# Patient Record
Sex: MALE | Race: BLACK OR AFRICAN AMERICAN | Employment: FULL TIME | URBAN - METROPOLITAN AREA
[De-identification: names, ages, dates, MRNs, and addresses within clinical notes are randomized per-mention and may not be internally consistent; named-entity substitution may affect disease eponyms.]

---

## 2019-08-21 NOTE — PERIOP NOTES
[facility]  Ambulatory Surgery Unit  Pre-operative Instructions    Surgery/Procedure Date  8/28            Tentative Arrival Time 10:30am      1. On the day of your surgery/procedure, please report to the Ambulatory Surgery Unit Registration Desk and sign in at your designated time. The Ambulatory Surgery Unit is located in [facility] on the [facility] side of the \[facility] [place] [facility]\"" across from the [address] [place] [address]. Please have all of your health insurance cards and a photo ID. 2. You must have someone with you to drive you home, as you should not drive a car for 24 hours following anesthesia. Please make arrangements for a responsible adult friend or family member to stay with you for at least the first 24 hours after your surgery. 3. Do not have anything to eat or drink (including water, gum, mints, coffee, juice) after 11:59 PM  8/27. This may not apply to medications prescribed by your physician. (Please note below the special instructions with medications to take the morning of surgery, if applicable.)    4. We recommend you do not drink any alcoholic beverages for 24 hours before and after your surgery. 5. Contact your surgeons office for instructions on the following medications: non-steroidal anti-inflammatory drugs (i.e. Advil, Aleve), vitamins, and supplements. (Some surgeons will want you to stop these medications prior to surgery and others may allow you to take them)   **If you are currently taking Plavix, Coumadin, Aspirin and/or other blood-thinning agents, contact your surgeon for instructions. ** Your surgeon will partner with the physician prescribing these medications to determine if it is safe to stop or if you need to continue taking. Please do not stop taking these medications without instructions from your surgeon.     6. In an effort to help prevent surgical site infection, we ask that you shower with an anti-bacterial soap (i.e. Dial/Safeguard, or the soap provided to you at your preadmission testing appointment) for 3 days prior to and on the morning of surgery, using a fresh towel after each shower. (Please begin this process with fresh bed linens.) Do not apply any lotions, powders, or deodorants after the shower on the day of your procedure. If applicable, please do not shave the operative site for 48 hours prior to surgery. 7. Wear comfortable clothes. Wear glasses instead of contacts. Do not bring any jewelry or money (other than copays or fees as instructed). Do not wear make-up, particularly mascara, the morning of your surgery. Do not wear nail polish, particularly if you are having foot /hand surgery. Wear your hair loose or down, no ponytails, buns, benjamin pins or clips. All body piercings must be removed. 8. You should understand that if you do not follow these instructions your surgery may be cancelled. If your physical condition changes (i.e. fever, cold or flu) please contact your surgeon as soon as possible. 9. It is important that you be on time. If a situation occurs where you may be late, or if you have any questions or problems, please call [phone].    10. Your surgery time may be subject to change. You will receive a phone call the day prior to surgery to confirm your arrival time. Special Instructions: Take all medications and inhalers, as prescribed, on the morning of surgery with a sip of water EXCEPT: none      I understand a pre-operative phone call will be made to verify my surgery time. In the event that I am not available, I give permission for a message to be left on my answering service and/or with another person?       yes    Reviewed by phone with pt, verbalized understanding.     ___________________      ___________________      ________________  (Signature of Patient)          (Witness)                   (Date and Time)

## 2019-08-27 ENCOUNTER — ANESTHESIA EVENT (OUTPATIENT)
Dept: SURGERY | Age: 24
End: 2019-08-27
Payer: MEDICAID

## 2019-08-28 ENCOUNTER — ANESTHESIA (OUTPATIENT)
Dept: SURGERY | Age: 24
End: 2019-08-28
Payer: MEDICAID

## 2019-08-28 ENCOUNTER — HOSPITAL ENCOUNTER (OUTPATIENT)
Age: 24
Setting detail: OUTPATIENT SURGERY
Discharge: HOME | End: 2019-08-28
Attending: ORTHOPAEDIC SURGERY | Admitting: ORTHOPAEDIC SURGERY
Payer: MEDICAID

## 2019-08-28 VITALS
HEIGHT: 70 IN | OXYGEN SATURATION: 99 % | DIASTOLIC BLOOD PRESSURE: 94 MMHG | HEART RATE: 89 BPM | WEIGHT: 227 LBS | BODY MASS INDEX: 32.5 KG/M2 | RESPIRATION RATE: 14 BRPM | TEMPERATURE: 98.1 F | SYSTOLIC BLOOD PRESSURE: 140 MMHG

## 2019-08-28 DIAGNOSIS — G89.18 POST-OP PAIN: Primary | ICD-10-CM

## 2019-08-28 PROCEDURE — 77030006884 HC BLD SHV INCIS S&N -B: Performed by: ORTHOPAEDIC SURGERY

## 2019-08-28 PROCEDURE — 74011250636 HC RX REV CODE- 250/636: Performed by: ANESTHESIOLOGY

## 2019-08-28 PROCEDURE — 77030020255 HC SOL INJ LR 1000ML BG: Performed by: ORTHOPAEDIC SURGERY

## 2019-08-28 PROCEDURE — 74011250636 HC RX REV CODE- 250/636: Performed by: NURSE ANESTHETIST, CERTIFIED REGISTERED

## 2019-08-28 PROCEDURE — C1713 ANCHOR/SCREW BN/BN,TIS/BN: HCPCS | Performed by: ORTHOPAEDIC SURGERY

## 2019-08-28 PROCEDURE — 77030018835 HC SOL IRR LR ICUM -A: Performed by: ORTHOPAEDIC SURGERY

## 2019-08-28 PROCEDURE — 76210000057 HC AMBSU PH II REC 1 TO 1.5 HR: Performed by: ORTHOPAEDIC SURGERY

## 2019-08-28 PROCEDURE — 77030021352 HC CBL LD SYS DISP COVD -B: Performed by: ORTHOPAEDIC SURGERY

## 2019-08-28 PROCEDURE — 77030037837: Performed by: ORTHOPAEDIC SURGERY

## 2019-08-28 PROCEDURE — 76210000034 HC AMBSU PH I REC 0.5 TO 1 HR: Performed by: ORTHOPAEDIC SURGERY

## 2019-08-28 PROCEDURE — 77030010816: Performed by: ORTHOPAEDIC SURGERY

## 2019-08-28 PROCEDURE — 74011250637 HC RX REV CODE- 250/637: Performed by: ANESTHESIOLOGY

## 2019-08-28 PROCEDURE — 76060000064 HC AMB SURG ANES 2 TO 2.5 HR: Performed by: ORTHOPAEDIC SURGERY

## 2019-08-28 PROCEDURE — 77030008496 HC TBNG ARTHSC IRR S&N -B: Performed by: ORTHOPAEDIC SURGERY

## 2019-08-28 PROCEDURE — 77030002916 HC SUT ETHLN J&J -A: Performed by: ORTHOPAEDIC SURGERY

## 2019-08-28 PROCEDURE — 76030000021 HC AMB SURG 2 TO 2.5 HR INTENSV-TIER 1: Performed by: ORTHOPAEDIC SURGERY

## 2019-08-28 PROCEDURE — 77030026438 HC STYL ET INTUB CARD -A: Performed by: ANESTHESIOLOGY

## 2019-08-28 PROCEDURE — 74011000250 HC RX REV CODE- 250: Performed by: ANESTHESIOLOGY

## 2019-08-28 PROCEDURE — C1762 CONN TISS, HUMAN(INC FASCIA): HCPCS | Performed by: ORTHOPAEDIC SURGERY

## 2019-08-28 PROCEDURE — 77030020274 HC MISC IMPL ORTHOPEDIC: Performed by: ORTHOPAEDIC SURGERY

## 2019-08-28 PROCEDURE — 77030019908 HC STETH ESOPH SIMS -A: Performed by: ANESTHESIOLOGY

## 2019-08-28 PROCEDURE — 77030008684 HC TU ET CUF COVD -B: Performed by: ANESTHESIOLOGY

## 2019-08-28 PROCEDURE — 77030013789 HC KT REP BIO TEND ARTH -C: Performed by: ORTHOPAEDIC SURGERY

## 2019-08-28 PROCEDURE — 74011250636 HC RX REV CODE- 250/636: Performed by: ORTHOPAEDIC SURGERY

## 2019-08-28 RX ORDER — FENTANYL CITRATE 50 UG/ML
INJECTION, SOLUTION INTRAMUSCULAR; INTRAVENOUS AS NEEDED
Status: DISCONTINUED | OUTPATIENT
Start: 2019-08-28 | End: 2019-08-28 | Stop reason: HOSPADM

## 2019-08-28 RX ORDER — FENTANYL CITRATE 50 UG/ML
25 INJECTION, SOLUTION INTRAMUSCULAR; INTRAVENOUS
Status: DISCONTINUED | OUTPATIENT
Start: 2019-08-28 | End: 2019-08-28 | Stop reason: HOSPADM

## 2019-08-28 RX ORDER — LIDOCAINE HYDROCHLORIDE 10 MG/ML
0.1 INJECTION, SOLUTION EPIDURAL; INFILTRATION; INTRACAUDAL; PERINEURAL AS NEEDED
Status: DISCONTINUED | OUTPATIENT
Start: 2019-08-28 | End: 2019-08-28 | Stop reason: HOSPADM

## 2019-08-28 RX ORDER — SODIUM CHLORIDE, SODIUM LACTATE, POTASSIUM CHLORIDE, CALCIUM CHLORIDE 600; 310; 30; 20 MG/100ML; MG/100ML; MG/100ML; MG/100ML
25 INJECTION, SOLUTION INTRAVENOUS CONTINUOUS
Status: DISCONTINUED | OUTPATIENT
Start: 2019-08-28 | End: 2019-08-28 | Stop reason: HOSPADM

## 2019-08-28 RX ORDER — MIDAZOLAM HYDROCHLORIDE 1 MG/ML
INJECTION, SOLUTION INTRAMUSCULAR; INTRAVENOUS AS NEEDED
Status: DISCONTINUED | OUTPATIENT
Start: 2019-08-28 | End: 2019-08-28 | Stop reason: HOSPADM

## 2019-08-28 RX ORDER — CEFAZOLIN SODIUM/WATER 2 G/20 ML
SYRINGE (ML) INTRAVENOUS
Status: COMPLETED
Start: 2019-08-28 | End: 2019-08-28

## 2019-08-28 RX ORDER — SODIUM CHLORIDE 0.9 % (FLUSH) 0.9 %
5-40 SYRINGE (ML) INJECTION AS NEEDED
Status: DISCONTINUED | OUTPATIENT
Start: 2019-08-28 | End: 2019-08-28 | Stop reason: HOSPADM

## 2019-08-28 RX ORDER — ACETAMINOPHEN 500 MG
1000 TABLET ORAL ONCE
Status: COMPLETED | OUTPATIENT
Start: 2019-08-28 | End: 2019-08-28

## 2019-08-28 RX ORDER — OXYCODONE HYDROCHLORIDE 5 MG/1
5 TABLET ORAL
Qty: 40 TAB | Refills: 0 | Status: SHIPPED | OUTPATIENT
Start: 2019-08-28 | End: 2019-08-31

## 2019-08-28 RX ORDER — ROPIVACAINE HYDROCHLORIDE 5 MG/ML
INJECTION, SOLUTION EPIDURAL; INFILTRATION; PERINEURAL
Status: COMPLETED | OUTPATIENT
Start: 2019-08-28 | End: 2019-08-28

## 2019-08-28 RX ORDER — ONDANSETRON 2 MG/ML
INJECTION INTRAMUSCULAR; INTRAVENOUS AS NEEDED
Status: DISCONTINUED | OUTPATIENT
Start: 2019-08-28 | End: 2019-08-28 | Stop reason: HOSPADM

## 2019-08-28 RX ORDER — DIPHENHYDRAMINE HYDROCHLORIDE 50 MG/ML
12.5 INJECTION, SOLUTION INTRAMUSCULAR; INTRAVENOUS AS NEEDED
Status: DISCONTINUED | OUTPATIENT
Start: 2019-08-28 | End: 2019-08-28 | Stop reason: HOSPADM

## 2019-08-28 RX ORDER — SUCCINYLCHOLINE CHLORIDE 20 MG/ML
INJECTION INTRAMUSCULAR; INTRAVENOUS AS NEEDED
Status: DISCONTINUED | OUTPATIENT
Start: 2019-08-28 | End: 2019-08-28 | Stop reason: HOSPADM

## 2019-08-28 RX ORDER — OXYCODONE AND ACETAMINOPHEN 5; 325 MG/1; MG/1
1 TABLET ORAL
Status: DISCONTINUED | OUTPATIENT
Start: 2019-08-28 | End: 2019-08-28 | Stop reason: HOSPADM

## 2019-08-28 RX ORDER — CEFAZOLIN SODIUM/WATER 2 G/20 ML
2 SYRINGE (ML) INTRAVENOUS ONCE
Status: COMPLETED | OUTPATIENT
Start: 2019-08-28 | End: 2019-08-28

## 2019-08-28 RX ORDER — HYDROMORPHONE HYDROCHLORIDE 1 MG/ML
.2-.5 INJECTION, SOLUTION INTRAMUSCULAR; INTRAVENOUS; SUBCUTANEOUS ONCE
Status: DISCONTINUED | OUTPATIENT
Start: 2019-08-28 | End: 2019-08-28 | Stop reason: HOSPADM

## 2019-08-28 RX ORDER — DEXAMETHASONE SODIUM PHOSPHATE 4 MG/ML
INJECTION, SOLUTION INTRA-ARTICULAR; INTRALESIONAL; INTRAMUSCULAR; INTRAVENOUS; SOFT TISSUE AS NEEDED
Status: DISCONTINUED | OUTPATIENT
Start: 2019-08-28 | End: 2019-08-28 | Stop reason: HOSPADM

## 2019-08-28 RX ORDER — MORPHINE SULFATE 10 MG/ML
2 INJECTION, SOLUTION INTRAMUSCULAR; INTRAVENOUS
Status: DISCONTINUED | OUTPATIENT
Start: 2019-08-28 | End: 2019-08-28 | Stop reason: HOSPADM

## 2019-08-28 RX ORDER — MIDAZOLAM HYDROCHLORIDE 1 MG/ML
INJECTION, SOLUTION INTRAMUSCULAR; INTRAVENOUS
Status: COMPLETED
Start: 2019-08-28 | End: 2019-08-28

## 2019-08-28 RX ORDER — SODIUM CHLORIDE 0.9 % (FLUSH) 0.9 %
5-40 SYRINGE (ML) INJECTION EVERY 8 HOURS
Status: DISCONTINUED | OUTPATIENT
Start: 2019-08-28 | End: 2019-08-28 | Stop reason: HOSPADM

## 2019-08-28 RX ORDER — PROPOFOL 10 MG/ML
INJECTION, EMULSION INTRAVENOUS AS NEEDED
Status: DISCONTINUED | OUTPATIENT
Start: 2019-08-28 | End: 2019-08-28 | Stop reason: HOSPADM

## 2019-08-28 RX ORDER — ACETAMINOPHEN 500 MG
TABLET ORAL
Status: DISCONTINUED
Start: 2019-08-28 | End: 2019-08-28 | Stop reason: HOSPADM

## 2019-08-28 RX ORDER — FENTANYL CITRATE 50 UG/ML
INJECTION, SOLUTION INTRAMUSCULAR; INTRAVENOUS
Status: COMPLETED
Start: 2019-08-28 | End: 2019-08-28

## 2019-08-28 RX ORDER — OXYCODONE AND ACETAMINOPHEN 5; 325 MG/1; MG/1
1 TABLET ORAL
Qty: 40 TAB | Refills: 0 | Status: SHIPPED | OUTPATIENT
Start: 2019-08-28 | End: 2019-08-28

## 2019-08-28 RX ORDER — ROPIVACAINE HYDROCHLORIDE 5 MG/ML
INJECTION, SOLUTION EPIDURAL; INFILTRATION; PERINEURAL
Status: COMPLETED
Start: 2019-08-28 | End: 2019-08-28

## 2019-08-28 RX ADMIN — FENTANYL CITRATE 25 MCG: 50 INJECTION, SOLUTION INTRAMUSCULAR; INTRAVENOUS at 15:06

## 2019-08-28 RX ADMIN — SUCCINYLCHOLINE CHLORIDE 140 MG: 20 INJECTION, SOLUTION INTRAMUSCULAR; INTRAVENOUS at 12:32

## 2019-08-28 RX ADMIN — FENTANYL CITRATE 50 MCG: 50 INJECTION, SOLUTION INTRAMUSCULAR; INTRAVENOUS at 11:45

## 2019-08-28 RX ADMIN — ROPIVACAINE HYDROCHLORIDE 20 ML: 5 INJECTION, SOLUTION EPIDURAL; INFILTRATION; PERINEURAL at 11:52

## 2019-08-28 RX ADMIN — SODIUM CHLORIDE, SODIUM LACTATE, POTASSIUM CHLORIDE, AND CALCIUM CHLORIDE 25 ML/HR: 600; 310; 30; 20 INJECTION, SOLUTION INTRAVENOUS at 11:23

## 2019-08-28 RX ADMIN — FENTANYL CITRATE 50 MCG: 50 INJECTION, SOLUTION INTRAMUSCULAR; INTRAVENOUS at 11:57

## 2019-08-28 RX ADMIN — Medication 2 G: at 12:38

## 2019-08-28 RX ADMIN — PROPOFOL 200 MG: 10 INJECTION, EMULSION INTRAVENOUS at 12:32

## 2019-08-28 RX ADMIN — ONDANSETRON HYDROCHLORIDE 8 MG: 2 INJECTION, SOLUTION INTRAMUSCULAR; INTRAVENOUS at 12:58

## 2019-08-28 RX ADMIN — FENTANYL CITRATE 25 MCG: 50 INJECTION, SOLUTION INTRAMUSCULAR; INTRAVENOUS at 14:58

## 2019-08-28 RX ADMIN — DEXAMETHASONE SODIUM PHOSPHATE 8 MG: 4 INJECTION, SOLUTION INTRAMUSCULAR; INTRAVENOUS at 12:37

## 2019-08-28 RX ADMIN — ROPIVACAINE HYDROCHLORIDE 25 ML: 5 INJECTION, SOLUTION EPIDURAL; INFILTRATION; PERINEURAL at 12:10

## 2019-08-28 RX ADMIN — PROMETHAZINE HYDROCHLORIDE 2.5 MG: 25 INJECTION INTRAMUSCULAR; INTRAVENOUS at 15:03

## 2019-08-28 RX ADMIN — ACETAMINOPHEN 1000 MG: 500 TABLET ORAL at 15:56

## 2019-08-28 RX ADMIN — MIDAZOLAM HYDROCHLORIDE 5 MG: 1 INJECTION, SOLUTION INTRAMUSCULAR; INTRAVENOUS at 11:45

## 2019-08-28 RX ADMIN — PROPOFOL 30 MG: 10 INJECTION, EMULSION INTRAVENOUS at 13:17

## 2019-08-28 RX ADMIN — PROPOFOL 30 MG: 10 INJECTION, EMULSION INTRAVENOUS at 13:42

## 2019-08-28 RX ADMIN — FENTANYL CITRATE 25 MCG: 50 INJECTION, SOLUTION INTRAMUSCULAR; INTRAVENOUS at 15:01

## 2019-08-28 NOTE — H&P
GENERIC PRE-OP HISTORY AND PHYSICAL    Subjective:     Patient is a 30 y.o.  male presented with a history of left knee pain. Onset of symptoms was abrupt with gradually worsening course since that time. He is being admitted for surgical management of this condition. The indications for the procedure include L PCL tear. There are no active problems to display for this patient. Past Medical History:   Diagnosis Date    H/O seasonal allergies       History reviewed. No pertinent surgical history. Prior to Admission medications    Not on File     No Known Allergies   Social History     Tobacco Use    Smoking status: Never Smoker    Smokeless tobacco: Never Used   Substance Use Topics    Alcohol use: Not Currently      History reviewed. No pertinent family history. Review of Systems  Pertinent items are noted in HPI. Objective:     Patient Vitals for the past 8 hrs:   BP Temp Pulse Resp SpO2 Height Weight   08/28/19 1225   84 14      08/28/19 1220 128/89  83 11 99 %     08/28/19 1215 121/85  87 12 99 %     08/28/19 1210 119/69  79 14 100 %     08/28/19 1203 113/80  83 12 99 %     08/28/19 1119 143/76 98.8 °F (37.1 °C) 78 15 97 % 5' 10\" (1.778 m) 103 kg (227 lb)     Visit Vitals  /89 (BP 1 Location: Left arm, BP Patient Position: At rest)   Pulse 84   Temp 98.8 °F (37.1 °C)   Resp 14   Ht 5' 10\" (1.778 m)   Wt 103 kg (227 lb)   SpO2 99%   BMI 32.57 kg/m²     General:  Alert, cooperative, no distress, appears stated age. Head:  Normocephalic, without obvious abnormality, atraumatic. Eyes:  Conjunctivae/corneas clear. PERRL, EOMs intact. Fundi benign   Ears:  Normal TMs and external ear canals both ears. Nose: Nares normal. Septum midline. Mucosa normal. No drainage or sinus tenderness.    Throat: Lips, mucosa, and tongue normal. Teeth and gums normal.   Neck: Supple, symmetrical, trachea midline, no adenopathy, thyroid: no enlargement/tenderness/nodules, no carotid bruit and no JVD. Back:   Symmetric, no curvature. ROM normal. No CVA tenderness. Lungs:   Clear to auscultation bilaterally. Chest wall:  No tenderness or deformity. Heart:  Regular rate and rhythm, S1, S2 normal, no murmur, click, rub or gallop. Abdomen:   Soft, non-tender. Bowel sounds normal. No masses,  No organomegaly. Genitalia:  Normal male without lesion, discharge or tenderness. Rectal:  Normal tone, normal prostate, no masses or tenderness  Guaiac negative stool. Extremities: Extremities normal, atraumatic, no cyanosis or edema. Pulses: 2+ and symmetric all extremities. Skin: Skin color, texture, turgor normal. No rashes or lesions   Lymph nodes: Cervical, supraclavicular, and axillary nodes normal.   Neurologic: CNII-XII intact. Normal strength, sensation and reflexes throughout. Positive posterior drawer L knee    Imaging Review  Mri left knee shows L pcl tear, possible mmt    Assessment:     Active Problems:    * No active hospital problems. *    L PCL Tear  Plan:     The various methods of treatment have been discussed with the patient and family. After consideration of risks, benefits and other options for treatment, the patient has consented to surgical interventions (L knee arthroscopy, pcl recon, possible mmr). The risks of surgery were explained. Risks of infection, blood loss, neurovascular injury, anesthesia risks, and risks secondary to patient comorbidities were explained. Questions were answered and Pre-op teaching was done by nursing.      Angella Rdz

## 2019-08-28 NOTE — PERIOP NOTES
Saira Palomares  1995  [id number]    Situation:  Verbal report given from: Alondra Martinez CRNA, RN  Procedure: Procedure(s):  LEFT KNEE ARTHROSCOPY WITH POSTERIOR CRUCIATE LIGAMENT RECONSTRUCTION, PARTIAL MEDIAL MENISCECTOMY    Background:    Preoperative diagnosis: LEFT KNEE PCL TEAR    Postoperative diagnosis: LEFT KNEE PCL TEAR    :  Dr. Varsha Loya    Assistant(s): Circ-1: Melissa Gunn RN  Scrub Tech-1: Jay Guallpa  Surg Asst-1: Colonel Colace    Specimens: * No specimens in log *    Assessment:  Intra-procedure medications         Anesthesia gave intra-procedure sedation and medications, see anesthesia flow sheet     Intravenous fluids: LR@ KVO     Vital signs stable       Recommendation:

## 2019-08-28 NOTE — PERIOP NOTES
Rober Paws applied at this time and set to appropriate temperature. Patient given remote and instructed on use. Will continue to monitor.

## 2019-08-28 NOTE — PERIOP NOTES
Dr. Yvonne Duron performed a left sciatic nerve block with ultrasound and stimulator. Patient on continuous cardiac and pulse oximetry monitoring throughout the procedure, nasal cannula 3 liters. Patient received 5 mg IV Versed and 100 mcg IV Fentanyl. Vital signs stable. Patient tolerated procedure well. Patient drowsy but arouses when spoken to. Will continue to monitor.

## 2019-08-28 NOTE — DISCHARGE INSTRUCTIONS
>>>You received an PO form of Tylenol 1000mg (Ofirmev) during your surgery, you may take tylenol (or pain medication containing Tylenol or Acetaminophen) in 6 hours at 10 pm today.<<<      [facility]  Dr Vianey Correa  PCL Surgery: Postoperative Instructions    IMPORTANT  Successful rehab after PCL surgery has been directly associated with the ability to fully straighten out your knee at the end of rehab. Following these instructions will help ensure that we do everything possible to achieve this goal. Elevation of the leg along with cold therapy is critical in the first weeks after surgery in an effort to reduce swelling. This ensures that the knee will move easily to regain motion once therapy is started the second week. · Your leg has been placed in a hinged brace after surgery for comfort and to protect your new graft. · The brace has been locked at 10 degrees to keep your leg as straight as possible after surgery   · This allows you to walk on the leg without the leg giving way and to prevent you from losing the ability to fully straighten your leg. · This brace should be worn AT ALL TIMES when you are out of bed after surgery   · This brace may be taken off when you are in bed and laying down so that you may apply the cryotherapy unit (polar care ice) over the dressing. · The optimal position of the leg after surgery is for you to be lying flat with your ankle higher than your heart , in an effort to reduce swelling. · Place pillows under your ANKLE to allow your knee to sag backwards to keep it straight   · You may practice bending the knee with no weight on it, but should return it to straight position when you are resting & elevating  · DO NOT sit with a pillow under your knee, as this will lead you to being unable to fully straighten your leg after surgery. · If for some reason your brace becomes unlocked or loose, please contact my office for instruction on how to fix this issue.   · You will come out of your brace with your physical therapist for therapy, but will not be released from your brace until you can demonstrate the ability to walk without crutches or a limp. DIET  · You may resume your regular diet once you tolerate liquids/bland food without nausea    MEDICATION  · Take the pain medication as prescribed, WITH FOOD  · While taking pain medications, you may NOT operate a vehicle, heavy machinery, or appliances, or drink alcoholic beverages  · If you have an allergic reaction to the medication, stop taking it and call my office. · If you are not allergic, take one Aspirin 81 mg twice a day after eating to prevent blood clots  · Please keep in mind that constipation is a very common side effect of taking narcotic pain medication. Take precautions to prevent constipation:  · Drink plenty of water (6-8 -  8 ounce glasses a day)  · Avoid alcohol, caffeine & dairy products  · Eat plenty of fiber (fruits, vegetables & whole grains)  · Take an over the counter stool softener such as Colace or Dulcolax or Miralax    ACTIVITY  · You may practice quadriceps muscle tightening and straight leg raises several times every hour  · Please continue to move your ankle up and down and tighten and relax your calf muscles several times every hour to help reduce swelling and prevent blood clots  · Please use your crutches until your first post operative visit  · If comfortable, you may bear weight on your leg with the assistance of crutches  · It is important to continuously elevate your knee above your heart until your swelling is completely down. · Please keep ice applied to the knee for the first 72 hours or as long as pain or swelling is persists. Do not apply ice directly to the skin, or allow water to leak on to your dressing. DRESSING CARE  · Keep the dressing clean and dry. · It is normal to get some bloody drainage through the bandage. DO NOT BE ALARMED.   · If the dressing gets soaked with drainage, please reinforce with a dry sterile dressing. · Loosen the ace wrap around your knee if it becomes too tight or painful. · Remove all dressings 3 days after surgery. If there is still some wound seepage, apply a fresh STERILE gauze over the incisions and secure with tape or an ace wrap. · DO NOT TOUCH OR REMOVE THE SUTURES! SHOWERING  · You may sponge bathe for the first 72 hours, taking care to keep the dressing clean and dry. · You may remove the dressing in 3 days and shower after surgery unless told otherwise. DO NOT immerse the knee under water. DO NOT rub/scrub the incision. DO NOT apply any ointments. After showering pat the incisions dry & place new Band-Aids over the sutures. EMERGENCY/FOLLOW - UP  · Please notify my office at [phone] if you develop any fever (above 101), unexpected warmth, redness or swelling. Please call if your toes become cold, purple, numb, or there is excessive bleeding. · Please call the office within 24 hours at [phone] to schedule a follow up appointment within 7-10 days from surgery. · Narcotic pain medication refills cannot be called into your pharmacy and the prescription must be picked up at our office. No pain medication refills can be provided after 3pm on Fridays or over the weekend. TO PREVENT AN INFECTION      1. [address] YOUR HANDS     To prevent infection, good handwashing is the most important thing you or your caregiver can do.  Wash your hands with soap and water or use the hand  we gave you before you touch any wounds. 2. SHOWER     Use the antibacterial soap we gave you when you take a shower.  Shower with this soap until your wounds are healed.  To reach all areas of your body, you may need someone to help you.  Dont forget to clean your belly button with every shower. 3.  USE CLEAN SHEETS     Use freshly cleaned sheets on your bed after surgery.       To keep the surgery site clean, do not allow pets to sleep with you while your wound is still healing. 4. STOP SMOKING     Stop smoking, or at least cut back on smoking     Smoking slows your healing. 5.  CONTROL YOUR BLOOD SUGAR     High blood sugars slow wound healing.  If you are diabetic, control your blood sugar levels before and after your surgery. DO NOT TAKE TYLENOL/ACETAMINOPHEN WITH PERCOCET, [facility], [address] TAKE NARCOTIC PAIN MEDICATIONS WITH FOOD! For the night of surgery, while block is still in effect, start with 1 pain pill at bedtime    Narcotics tend to be constipating and we recommend taking a stool softener such as Colace or Miralax (follow package instructions). If you were given prescriptions, please review the written information on the prescribed medications. DO NOT DRIVE WHILE TAKING NARCOTIC PAIN MEDICATIONS. CPAP PATIENTS BE SURE TO WEAR MACHINE WHENEVER NAPPING OR SLEEPING DAY/NIGHT OF SURGERY! DISCHARGE SUMMARY from Nurse    The following personal items collected during your admission are returned to you:   Dental Appliance: Dental Appliances: Uppers, Lowers(perm. retainers in mouth)  Vision: Visual Aid: Contacts  Hearing Aid:    Jewelry: Jewelry: None  Clothing: Clothing: With patient(under stretcher )  Other Valuables: Other Valuables: Contact Lenses(given to wife)  Valuables sent to safe:        PATIENT INSTRUCTIONS:    After General Anesthesia or Intravenous Sedation, for 24 hours or while taking prescription Narcotics:  · Someone should be with you for the next 24 hours. · For your own safety, a responsible adult must drive you home. · Limit your activities  · Recommended activity: Rest today, up with assistance today. Do not climb stairs or shower unattended for the next 24 hours. · Start with a soft bland diet and advance as tolerated (no nausea) to regular diet.   · If you have a sore throat some things that may help are: fluids, warm salt water gargle, or throat lozenges. If this does not improve after several days please follow up with your family physician. · Do not drive and operate hazardous machinery  · Do not make important personal or business decisions  · Do  not drink alcoholic beverages  · If you have not urinated within 8 hours after discharge, please contact your surgeon on call. Report the following to your surgeon:  · Excessive pain, swelling, redness or odor of or around the surgical area  · Temperature over 100.5  · Nausea and vomiting lasting longer than 4 hours or if unable to take medications  · Any signs of decreased circulation or nerve impairment to extremity: change in color, persistent  numbness, tingling, coldness or increase pain    · If you received an upper extremity nerve block, please wear your sling until the block has worn off, then refer to your surgeons post-operative instructions. If you have had a shoulder block or a block near your collar bone, you may have symptoms such as:          1. Mild shortness of breath        2. A hoarse voice        3. Blurry vision        4. Unequal pupils        5. Drooping of your face on the same side as the nerve block. These symptoms will disappear as the nerve block wears off. · If you received a lower extremity nerve block, please use your crutches, walker, or cane until the nerve block has worn off, then refer to your surgeons post-operative instructions.    · You will receive a Post Operative Call from one of the Recovery Room Nurses on the day after your surgery to check on you. It is very important for us to know how you are recovering after your surgery. If you have an issue please call your surgeon, do not wait for the post operative call. · You may receive an e-mail or letter in the mail from [employer] regarding your experience with us in the Ambulatory Surgery Unit. Your feedback is valuable to us and we appreciate your participation in the survey.    ·   · If the above instructions are not adequate or you are having problems after your surgery, call your physician at their office number. ·   · We wish youre a speedy recovery ? What to do at Home:    *  Please give a list of your current medications to your Primary Care Provider. *  Please update this list whenever your medications are discontinued, doses are      changed, or new medications (including over-the-counter products) are added. *  Please carry medication information at all times in case of emergency situations. If you have not had your influenza or pneumococcal vaccines, please follow up with your primary care physician. The discharge information has been reviewed with the patient and caregiver. The patient and caregiver verbalized understanding. Ashok Barboza THROMBOSIS AND PULMONARY EMBOLUS    SURGICAL PATIENTS  Surgical patients are the #1 risk for DVT and PE. WHAT IS DVT? WHAT IS PE?  DVT is a serious condition where blood clots develop deep in the veins of the legs. PE occurs when a blood clot breaks loose from the wall of a vein and travels to the lungs blocking the pulmonary artery or one of its branches impairing blood flow from the heart, which could result in death.   RISK FACTORS   Surgery lasting longer than 45 minutes   History of inflammatory bowel disease   Oral contraceptive or hormone replacement therapy   Immobilization   Varicose veins / swollen legs   Smoking    CHF / Acute MI / Irregular heart beat   Family history of thrombosis   General anesthesia greater than 2 hours   Obesity   Infection of less than one month   Less than 1 month postpartum   COPD / Pneumonia   Arthroscopic surgery   Malignancy / cancer   Spine surgery   Blood abnormalities   Stroke / Paralysis / Coma    SIGNS AND SYMPTOMS OF DEEP VEIN TROMBOSIS   -  ER VISIT  Usually occurs in one leg, above or below the knee   Swelling - one calf or thigh may be larger than the other   Feeling increased warmth in the area of the leg that is swollen or painful   Leg pain, which may increase when standing or walking   Swelling along the vein of the leg   When swollen areas is pressed with a finger, a depression may remain   Tenderness of the leg that may be confined to one area   Change in leg color (bluish or red)    SIGNS AND SYMPTOMS OF PULMONARY EMBOLUS  - 911 CALL   Chest pain that gets worse with deep breath, coughing or chest movement   Coughing up blood   Sweating   Shortness of breath or difficulty breathing   Rapid heart beat   Lightheadedness    HOW TO REDUCE THE POSSIBLE RISK OF DVT   Exercise - simple activities as rotating ankles and wrists, wiggling toes and fingers, tightening and relaxing muscles in calves and thighs promotes circulation while recovering from surgery. Please do these exercises every hour during waking hours   Take mediation as prescribed by your physician (Lovenox, Coumadin, Aspirin)   Resume your normal activities as soon as your doctor advises you to do so.  Remember, when traveling, to [place] your legs frequently.         PATIENTS WHO BELIEVE THEY MAY BE EXPERIENCING SIGNS AND SYMPTOMS OF DVT OR PE SHOULD SEEK MEDICAL HELP IMMEDIATELY

## 2019-08-28 NOTE — ANESTHESIA PROCEDURE NOTES
Peripheral Block    Start time: 8/28/2019 11:58 AM  End time: 8/28/2019 12:04 PM  Performed by: Rafy Longo MD  Authorized by: Rafy Longo MD       Pre-procedure:    Indications: at surgeon's request and post-op pain management    Preanesthetic Checklist: patient identified, risks and benefits discussed, site marked, timeout performed, anesthesia consent given and patient being monitored    Timeout Time: 11:44          Block Type:   Block Type:  Femoral single shot  Laterality:  Left  Monitoring:  Standard ASA monitoring, responsive to questions and oxygen  Injection Technique:  Single shot  Procedures: ultrasound guided and nerve stimulator    Patient Position: supine  Prep: chlorhexidine    Location:  Upper thigh  Needle Type:  Stimuplex  Needle Gauge:  22 G  Needle Localization:  Ultrasound guidance and nerve stimulator  Motor Response: minimal motor response >0.4 mA      Assessment:  Number of attempts:  1  Injection Assessment:  Incremental injection every 5 mL, no paresthesia, ultrasound image on chart, local visualized surrounding nerve on ultrasound, negative aspiration for blood and no intravascular symptoms  Patient tolerance:  Patient tolerated the procedure well with no immediate complications

## 2019-08-28 NOTE — ANESTHESIA POSTPROCEDURE EVALUATION
Procedure(s):  LEFT KNEE ARTHROSCOPY WITH POSTERIOR CRUCIATE LIGAMENT RECONSTRUCTION, PARTIAL MEDIAL MENISCECTOMY. general, regional    Anesthesia Post Evaluation      Multimodal analgesia: multimodal analgesia used between 6 hours prior to anesthesia start to PACU discharge  Patient location during evaluation: bedside  Patient participation: complete - patient participated  Level of consciousness: awake and alert  Pain score: 3  Airway patency: patent  Anesthetic complications: no  Cardiovascular status: acceptable  Respiratory status: acceptable  Hydration status: acceptable  Comments: Pt has sciatic & femoral blocks. Non-weight bearing postop.   Post anesthesia nausea and vomiting:  none      Vitals Value Taken Time   /85 8/28/2019  3:30 PM   Temp 36.7 °C (98.1 °F) 8/28/2019  3:30 PM   Pulse 87 8/28/2019  3:30 PM   Resp 13 8/28/2019  3:30 PM   SpO2 100 % 8/28/2019  3:30 PM

## 2019-08-28 NOTE — PERIOP NOTES
Patient states that wife Leidy Williamson will be with them for at least 24 hours following today's procedure.

## 2019-08-28 NOTE — ANESTHESIA PROCEDURE NOTES
Peripheral Block    Start time: 8/28/2019 11:42 AM  End time: 8/28/2019 11:55 AM  Performed by: Albert Patel MD  Authorized by: Albert Patel MD       Pre-procedure:    Indications: at surgeon's request and post-op pain management    Preanesthetic Checklist: patient identified, risks and benefits discussed, site marked, timeout performed, anesthesia consent given and patient being monitored    Timeout Time: 11:44          Block Type:   Block Type:  Sciatic single shot  Laterality:  Left  Monitoring:  Standard ASA monitoring, responsive to questions and oxygen  Injection Technique:  Single shot  Procedures: nerve stimulator    Patient Position: right lateral decubitus  Prep: chlorhexidine    : transgluteal.  Needle Type:  Stimuplex  Needle Gauge:  21 G  Needle Localization:  Nerve stimulator  Motor Response: minimal motor response >0.4 mA      Assessment:  Number of attempts:  1  Injection Assessment:  Incremental injection every 5 mL, no paresthesia, negative aspiration for blood and no intravascular symptoms  Patient tolerance:  Patient tolerated the procedure well with no immediate complications

## 2019-08-28 NOTE — PERIOP NOTES
Permission received to review discharge instructions and discuss private health information with wife Karlos

## 2019-08-28 NOTE — PERIOP NOTES
1451-Pt received into recovery room, hurting, clenching fists. VSS. Pt medicated with fentanyl 75mg iv and phenergan 2.5mg iv.    1506-Permission received to review discharge instructions and discuss private health information with Karlos- wife. 1510-Pt now snoring. VSS    1530- Pt now starting to wake up, wife brought to bedside, to update on pt's status. VSS. Reviewed to follow up with a PCP regarding extra/skipped beats. 1545- Pt tolerating liquids and crackers, VSS     1600-Pt medicated with tylenol 1000 mg po for minor pain. Pt urinated w/o difficulty 250 of yellow urine in urinal.  VSS.        1645-D/c instructions reviewed, all questions answered. Reviewed when to call the doctor, when to call 911, diet, activity, hygiene, when to take pain meds. Reviewed how t- scope and polar care work. Reviewed regarding crutches, will have pt walk in crutches prior to d/c home. [id number] pt walking with crutches, in recovery room. Readjusted and had pt redemonstrate how to walk with crutches. 1659-Transported via w/c to awaiting transportation. No complaints at time of d/c home.

## 2019-08-30 NOTE — OP NOTES
[address]  OPERATIVE REPORT    Name:  Alexander Velazquez  MR#:  [id number]  :  1995  ACCOUNT #:  [de-identified]  DATE OF SERVICE:  2019    PREOPERATIVE DIAGNOSIS:  Left posterior cruciate ligament tear. POSTOPERATIVE DIAGNOSIS:  Left posterior cruciate ligament tear. PROCEDURE PERFORMED:  Left knee arthroscopy with PCL reconstruction using Arthrex All-Inside technique and allograft GraftLink. SURGEON:  Izzy Berry DO    ASSISTANT:  Kenny Baugh MD and [id number] [facility] staff. ANESTHESIA:  General with a nerve block. COMPLICATIONS:  None. SPECIMENS REMOVED:  None. IMPLANTS:  Arthrex All-Inside GraftLink technique. ESTIMATED BLOOD LOSS:  Minimal.    DISPOSITION:  Stable to PACU. INDICATIONS FOR THE PROCEDURE:  The patient is a 78-year-old male, who presented to the office after an injury. He was found to have a PCL tear on MRI. We discussed treatment options. Risks and benefits of left PCL reconstruction were explained. We discussed risks of infection, blood loss, neurovascular injury, anesthesia risk, and risk secondary to patient's comorbidities in relationship to PCL reconstruction. Informed consent was obtained for the procedure. He is medically optimized and ready for surgery on 2019. History and physical forms and consent forms were confirmed in his chart in the preoperative holding area. His operative extremity was marked by Orthopedics. He was given a nerve block by Anesthesia. REPORT OF OPERATION:  The patient was taken to the OR and transferred to the operating room table. He was placed in a supine position. All prominences were carefully padded. He was given sedation and LMA was placed by Anesthesia. Sterile prepping and draping was performed. After sterile prepping and draping, a time-out was called. The patient was identified by name, date of birth, operative site and operative procedure.   After all were in agreement that the left knee was the operative extremity, tourniquet was inflated to 300 mmHg, an incision was made through the lateral knee arthroscopy portal.  Diagnostic arthroscopy at the patellofemoral joint showed intact chondral structures. We moved to the medial compartment, which showed intact meniscal and chondral structures. There was question of a possible anterior and medial meniscus tear. However, root was noted to be intact on probing. We did identify a ligamentum mucosum layer that was debrided with the arthroscopic shaver. The proximal portion of the PCL was overall noted to be intact, but as we probed more distal to its tibial insertion, there was obvious tearing. We visualized the lateral compartment, which showed intact chondral meniscal structures and then we used the notch once more. We had made a medial portal through an Outside-In technique. We were able to use the arthroscopic shaver to debride the remaining portion of the torn PCL. Remnant fibers were noted from the torn portion of the tibial insertion point. We made an accessory posterior medial knee arthroscopy portal for help with preparing our tibial insertion point. We used the arthroscopic cautery and arthroscopic shaver to debride down to the main lateral border of the thigh to ensure that we were posterior and inferior enough at the tibial insertion point. We prepared an All-Inside allograft using the Arthrex GraftLink technique. We inserted the graft was appropriate size. A size 10 x 90 graft was used. We prepared the tibial and femoral insertion points and then we used the Arthrex FlipCutter device to drill each tunnel. We ensured that we protected the posterior neurovascular structures as we drilled our tibial component and we used the FlipCutter to back ream approximately 40 mm of tunnel. We placed the suture passing [address] for later passage. We then cleared the tunnel.   We did the same thing to prepare our femoral tunnel as we placed our PCL tunnel on the femur in the anatomic insertion point. We ensured all bony debris were irrigated out of the knee and we were able to pass our graft through the lateral portal into the tibia first.  We then visualized through the scope as we shifted the femoral button into place on the femoral side. We then secured approximately 20 to 25 mm of graft into the femoral tunnel while confirming at least 20 mm of graft within the tibial tunnel portion. We performed a Lachman type procedure to ensure full tightening of our PCL. We checked the posterior drawer test after placement of our PCL reconstruction and this gave excellent stability on posterior drawer testing. Probed PCL and noted it to be stable and intact. We took final pictures and closed with 3-0 nylon suture. Sterile dressings were applied and the patient was awoken by Anesthesia. He was placed in a hinged knee brace locked in extension. He was discharged home with specific instructions regarding dressing changes, activity restrictions, and followup information.   I will see him in the office in the next 7 to 10 days for suture removal.        Mynor Orta DO      [id number]  D:  08/29/2019 18:49  T:  08/29/2019 21:06  JOB #:  [id number]

## 2023-04-05 NOTE — BRIEF OP NOTE
BRIEF OPERATIVE NOTE    Date of Procedure: 8/28/2019   Preoperative Diagnosis: LEFT KNEE PCL TEAR  Postoperative Diagnosis: LEFT KNEE PCL TEAR    Procedure(s):  LEFT KNEE ARTHROSCOPY WITH POSTERIOR CRUCIATE LIGAMENT RECONSTRUCTION, PARTIAL MEDIAL MENISCECTOMY  Surgeon(s) and Role:     Kristin Shea DO - Primary         Surgical Assistant: Asya Dey    Surgical Staff:  Circ-1: Gavin Caputo RN  Scrub Tech-1: [facility]  Surg Asst-1: Eden Bird  Event Time In Time Out   Incision Start 1251    Incision Close       Anesthesia: Other   Estimated Blood Loss: min  Specimens: * No specimens in log *   Findings: pcl tear   Complications: none  Implants:   Implant Name Type Inv.  Item Serial No.  Lot No. LRB No. Used Action   flexigraft 10.5d  x 91mm L   [id number]  [id number] Left 1 Implanted   arthrex tightrope   NA  [id number] Left 1 Implanted   arthrex allograft graftlink pack   NA  [id number] Left 1 Implanted FAMILY HISTORY:  No pertinent family history in first degree relatives

## (undated) DEVICE — GOWN,SIRUS,FABRNF,XL,20/CS: Brand: MEDLINE

## (undated) DEVICE — STRIP SKIN CLSR W0.25XL4IN WHT SPUNBOUND FBR NYL HI ADH

## (undated) DEVICE — KENDALL DL ECG CABLE AND LEAD WIRE SYSTEM, 3-LEAD, SINGLE PATIENT USE: Brand: KENDALL

## (undated) DEVICE — SOLUTION IRRIG 3000ML LAC R FLX CONT

## (undated) DEVICE — ROCKER SWITCH PENCIL BLADE ELECTRODE, HOLSTER: Brand: EDGE

## (undated) DEVICE — COVER,TABLE,60X90,STERILE: Brand: MEDLINE

## (undated) DEVICE — SOLUTION LACTATED RINGERS INJECTION USP

## (undated) DEVICE — 4.5 MM INCISOR PLUS STRAIGHT                                    BLADES, POWER/EP-1, VIOLET, PACKAGED                                    6 PER BOX, STERILE

## (undated) DEVICE — INFECTION CONTROL KIT SYS

## (undated) DEVICE — COVER LT HNDL PLAS RIG 1 PER PK

## (undated) DEVICE — KIT INSTR W/ 2.4MM GUIDEPIN SUT PASS WIRE NO2 FIBERWIRE

## (undated) DEVICE — (D)PREP SKN CHLRAPRP APPL 26ML -- CONVERT TO ITEM 371833

## (undated) DEVICE — DRAPE,REIN 53X77,STERILE: Brand: MEDLINE

## (undated) DEVICE — STERILE POLYISOPRENE POWDER-FREE SURGICAL GLOVES: Brand: PROTEXIS

## (undated) DEVICE — Device

## (undated) DEVICE — CANNULA ARTHSCP L7CM ID8.25MM TRNSLUC THRD FLX W/ NO SQUIRT

## (undated) DEVICE — KNEE ARTHROSCOPY IV-LF: Brand: MEDLINE INDUSTRIES, INC.

## (undated) DEVICE — CONTINU-FLO SOLUTION SET, 2 INJECTION SITES, MALE LUER LOCK ADAPTER WITH RETRACTABLE COLLAR, LARGE BORE STOPCOCK WITH ROTATING MALE LUER LOCK EXTENSION SET, 2 INJECTION SITES, MALE LUER LOCK ADAPTER WITH RETRACTABLE COLLAR: Brand: INTERLINK/CONTINU-FLO

## (undated) DEVICE — DYONICS 25 INFLOW TUBE SET, 3 PER BOX

## (undated) DEVICE — SPONGE GZ W4XL4IN COT 12 PLY TYP VII WVN C FLD DSGN

## (undated) DEVICE — 4-PORT MANIFOLD: Brand: NEPTUNE 2

## (undated) DEVICE — REM POLYHESIVE ADULT PATIENT RETURN ELECTRODE: Brand: VALLEYLAB

## (undated) DEVICE — SURGICAL PROCEDURE PACK BASIN MAJ SET CUST NO CAUT

## (undated) DEVICE — SUT ETHLN 3-0 18IN PS2 BLK --

## (undated) DEVICE — PADDING CST 4INX4YD --

## (undated) DEVICE — TOWEL SURG W17XL27IN STD BLU COT NONFENESTRATED PREWASHED